# Patient Record
Sex: MALE | Race: WHITE | NOT HISPANIC OR LATINO | Employment: FULL TIME | URBAN - METROPOLITAN AREA
[De-identification: names, ages, dates, MRNs, and addresses within clinical notes are randomized per-mention and may not be internally consistent; named-entity substitution may affect disease eponyms.]

---

## 2019-05-03 ENCOUNTER — HOSPITAL ENCOUNTER (EMERGENCY)
Facility: CLINIC | Age: 34
Discharge: HOME | End: 2019-05-04
Attending: STUDENT IN AN ORGANIZED HEALTH CARE EDUCATION/TRAINING PROGRAM | Admitting: STUDENT IN AN ORGANIZED HEALTH CARE EDUCATION/TRAINING PROGRAM
Payer: COMMERCIAL
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
## 2019-05-03 DIAGNOSIS — T18.128A FOOD IMPACTION OF ESOPHAGUS, INITIAL ENCOUNTER: ICD-10-CM

## 2019-05-03 PROCEDURE — 99284 EMERGENCY DEPT VISIT MOD MDM: CPT | Mod: 25

## 2019-05-03 PROCEDURE — 25000128 H RX IP 250 OP 636: Performed by: STUDENT IN AN ORGANIZED HEALTH CARE EDUCATION/TRAINING PROGRAM

## 2019-05-03 PROCEDURE — 25500045 ZZH RX 255: Performed by: STUDENT IN AN ORGANIZED HEALTH CARE EDUCATION/TRAINING PROGRAM

## 2019-05-03 PROCEDURE — 99284 EMERGENCY DEPT VISIT MOD MDM: CPT | Mod: Z6 | Performed by: STUDENT IN AN ORGANIZED HEALTH CARE EDUCATION/TRAINING PROGRAM

## 2019-05-03 PROCEDURE — 96375 TX/PRO/DX INJ NEW DRUG ADDON: CPT

## 2019-05-03 PROCEDURE — 96374 THER/PROPH/DIAG INJ IV PUSH: CPT

## 2019-05-03 PROCEDURE — 96361 HYDRATE IV INFUSION ADD-ON: CPT

## 2019-05-03 RX ORDER — LORAZEPAM 2 MG/ML
0.5 INJECTION INTRAMUSCULAR ONCE
Status: COMPLETED | OUTPATIENT
Start: 2019-05-03 | End: 2019-05-03

## 2019-05-03 RX ORDER — LORAZEPAM 0.5 MG/1
0.5 TABLET ORAL ONCE
Status: DISCONTINUED | OUTPATIENT
Start: 2019-05-03 | End: 2019-05-03

## 2019-05-03 RX ADMIN — SODIUM CHLORIDE 500 ML: 9 INJECTION, SOLUTION INTRAVENOUS at 23:37

## 2019-05-03 RX ADMIN — LORAZEPAM 0.5 MG: 2 INJECTION INTRAMUSCULAR; INTRAVENOUS at 23:38

## 2019-05-03 RX ADMIN — GLUCAGON HYDROCHLORIDE 0.5 MG: 1 INJECTION, POWDER, FOR SOLUTION INTRAMUSCULAR; INTRAVENOUS; SUBCUTANEOUS at 23:39

## 2019-05-03 RX ADMIN — ANTACID/ANTIFLATULENT 4 G: 380; 550; 10; 10 GRANULE, EFFERVESCENT ORAL at 23:20

## 2019-05-04 ENCOUNTER — ANESTHESIA (OUTPATIENT)
Dept: SURGERY | Facility: CLINIC | Age: 34
End: 2019-05-04

## 2019-05-04 ENCOUNTER — ANESTHESIA EVENT (OUTPATIENT)
Dept: ANESTHESIOLOGY | Facility: CLINIC | Age: 34
End: 2019-05-04

## 2019-05-04 ENCOUNTER — ANESTHESIA EVENT (OUTPATIENT)
Dept: SURGERY | Facility: CLINIC | Age: 34
End: 2019-05-04

## 2019-05-04 ENCOUNTER — ANESTHESIA (OUTPATIENT)
Dept: ANESTHESIOLOGY | Facility: CLINIC | Age: 34
End: 2019-05-04
Payer: COMMERCIAL

## 2019-05-04 ENCOUNTER — HOSPITAL ENCOUNTER (INPATIENT)
Facility: CLINIC | Age: 34
Setting detail: SURGERY ADMIT
End: 2019-05-04
Attending: SURGERY | Admitting: SURGERY

## 2019-05-04 ENCOUNTER — HOSPITAL ENCOUNTER (OUTPATIENT)
Facility: CLINIC | Age: 34
Discharge: HOME | End: 2019-05-04
Attending: SURGERY | Admitting: SURGERY
Payer: COMMERCIAL

## 2019-05-04 VITALS
BODY MASS INDEX: 41.19 KG/M2 | SYSTOLIC BLOOD PRESSURE: 136 MMHG | RESPIRATION RATE: 16 BRPM | DIASTOLIC BLOOD PRESSURE: 89 MMHG | OXYGEN SATURATION: 97 % | WEIGHT: 292 LBS | HEART RATE: 107 BPM | TEMPERATURE: 97.9 F

## 2019-05-04 VITALS
RESPIRATION RATE: 16 BRPM | TEMPERATURE: 98.2 F | SYSTOLIC BLOOD PRESSURE: 141 MMHG | DIASTOLIC BLOOD PRESSURE: 67 MMHG | OXYGEN SATURATION: 98 %

## 2019-05-04 PROCEDURE — 36000052 ZZH SURGERY LEVEL 2 EA 15 ADDTL MIN: Performed by: SURGERY

## 2019-05-04 PROCEDURE — 25000128 H RX IP 250 OP 636: Performed by: NURSE ANESTHETIST, CERTIFIED REGISTERED

## 2019-05-04 PROCEDURE — 25000125 ZZHC RX 250: Performed by: NURSE ANESTHETIST, CERTIFIED REGISTERED

## 2019-05-04 PROCEDURE — 25800030 ZZH RX IP 258 OP 636: Performed by: NURSE ANESTHETIST, CERTIFIED REGISTERED

## 2019-05-04 PROCEDURE — 37000008 ZZH ANESTHESIA TECHNICAL FEE, 1ST 30 MIN: Performed by: SURGERY

## 2019-05-04 PROCEDURE — 71000012 ZZH RECOVERY PHASE 1 LEVEL 1 FIRST HR: Performed by: SURGERY

## 2019-05-04 PROCEDURE — 43247 EGD REMOVE FOREIGN BODY: CPT | Performed by: SURGERY

## 2019-05-04 PROCEDURE — 88305 TISSUE EXAM BY PATHOLOGIST: CPT | Performed by: SURGERY

## 2019-05-04 PROCEDURE — 40000671 ZZH STATISTIC ANESTHESIA CASE

## 2019-05-04 PROCEDURE — 25500045 ZZH RX 255: Performed by: STUDENT IN AN ORGANIZED HEALTH CARE EDUCATION/TRAINING PROGRAM

## 2019-05-04 PROCEDURE — 36000050 ZZH SURGERY LEVEL 2 1ST 30 MIN: Performed by: SURGERY

## 2019-05-04 PROCEDURE — 37000009 ZZH ANESTHESIA TECHNICAL FEE, EACH ADDTL 15 MIN: Performed by: SURGERY

## 2019-05-04 PROCEDURE — 71000027 ZZH RECOVERY PHASE 2 EACH 15 MINS: Performed by: SURGERY

## 2019-05-04 PROCEDURE — 43239 EGD BIOPSY SINGLE/MULTIPLE: CPT | Mod: 59 | Performed by: SURGERY

## 2019-05-04 RX ORDER — FENTANYL CITRATE 50 UG/ML
25-50 INJECTION, SOLUTION INTRAMUSCULAR; INTRAVENOUS
Status: DISCONTINUED | OUTPATIENT
Start: 2019-05-04 | End: 2019-05-04

## 2019-05-04 RX ORDER — PROPOFOL 10 MG/ML
INJECTION, EMULSION INTRAVENOUS PRN
Status: DISCONTINUED | OUTPATIENT
Start: 2019-05-04 | End: 2019-05-04

## 2019-05-04 RX ORDER — NALOXONE HYDROCHLORIDE 0.4 MG/ML
.1-.4 INJECTION, SOLUTION INTRAMUSCULAR; INTRAVENOUS; SUBCUTANEOUS
Status: DISCONTINUED | OUTPATIENT
Start: 2019-05-04 | End: 2019-05-05

## 2019-05-04 RX ORDER — SODIUM CHLORIDE, SODIUM LACTATE, POTASSIUM CHLORIDE, CALCIUM CHLORIDE 600; 310; 30; 20 MG/100ML; MG/100ML; MG/100ML; MG/100ML
INJECTION, SOLUTION INTRAVENOUS CONTINUOUS
Status: DISCONTINUED | OUTPATIENT
Start: 2019-05-04 | End: 2019-05-07

## 2019-05-04 RX ORDER — LIDOCAINE 40 MG/G
CREAM TOPICAL
Status: DISCONTINUED | OUTPATIENT
Start: 2019-05-04 | End: 2019-05-07

## 2019-05-04 RX ORDER — LIDOCAINE HYDROCHLORIDE 10 MG/ML
INJECTION, SOLUTION INFILTRATION; PERINEURAL PRN
Status: DISCONTINUED | OUTPATIENT
Start: 2019-05-04 | End: 2019-05-04

## 2019-05-04 RX ORDER — KETOROLAC TROMETHAMINE 30 MG/ML
30 INJECTION, SOLUTION INTRAMUSCULAR; INTRAVENOUS EVERY 6 HOURS PRN
Status: DISCONTINUED | OUTPATIENT
Start: 2019-05-04 | End: 2019-05-07

## 2019-05-04 RX ORDER — DEXAMETHASONE SODIUM PHOSPHATE 4 MG/ML
INJECTION, SOLUTION INTRA-ARTICULAR; INTRALESIONAL; INTRAMUSCULAR; INTRAVENOUS; SOFT TISSUE PRN
Status: DISCONTINUED | OUTPATIENT
Start: 2019-05-04 | End: 2019-05-04

## 2019-05-04 RX ORDER — LORAZEPAM 2 MG/ML
0.5 INJECTION INTRAMUSCULAR ONCE
Status: DISCONTINUED | OUTPATIENT
Start: 2019-05-04 | End: 2019-05-04 | Stop reason: HOSPADM

## 2019-05-04 RX ORDER — MEPERIDINE HYDROCHLORIDE 50 MG/ML
12.5 INJECTION INTRAMUSCULAR; INTRAVENOUS; SUBCUTANEOUS
Status: DISCONTINUED | OUTPATIENT
Start: 2019-05-04 | End: 2019-05-07

## 2019-05-04 RX ORDER — NALOXONE HYDROCHLORIDE 0.4 MG/ML
.1-.4 INJECTION, SOLUTION INTRAMUSCULAR; INTRAVENOUS; SUBCUTANEOUS
Status: DISCONTINUED | OUTPATIENT
Start: 2019-05-04 | End: 2019-05-04

## 2019-05-04 RX ORDER — FLUMAZENIL 0.1 MG/ML
0.2 INJECTION, SOLUTION INTRAVENOUS
Status: DISCONTINUED | OUTPATIENT
Start: 2019-05-04 | End: 2019-05-04

## 2019-05-04 RX ORDER — FENTANYL CITRATE 50 UG/ML
25-50 INJECTION, SOLUTION INTRAMUSCULAR; INTRAVENOUS
Status: DISCONTINUED | OUTPATIENT
Start: 2019-05-04 | End: 2019-05-07

## 2019-05-04 RX ORDER — ONDANSETRON 2 MG/ML
4 INJECTION INTRAMUSCULAR; INTRAVENOUS EVERY 6 HOURS PRN
Status: DISCONTINUED | OUTPATIENT
Start: 2019-05-04 | End: 2019-05-04

## 2019-05-04 RX ORDER — ONDANSETRON 4 MG/1
4 TABLET, ORALLY DISINTEGRATING ORAL EVERY 30 MIN PRN
Status: DISCONTINUED | OUTPATIENT
Start: 2019-05-04 | End: 2019-05-07

## 2019-05-04 RX ORDER — ONDANSETRON 4 MG/1
4 TABLET, ORALLY DISINTEGRATING ORAL EVERY 6 HOURS PRN
Status: DISCONTINUED | OUTPATIENT
Start: 2019-05-04 | End: 2019-05-04

## 2019-05-04 RX ORDER — ONDANSETRON 2 MG/ML
4 INJECTION INTRAMUSCULAR; INTRAVENOUS
Status: DISCONTINUED | OUTPATIENT
Start: 2019-05-04 | End: 2019-05-07

## 2019-05-04 RX ORDER — ONDANSETRON 2 MG/ML
INJECTION INTRAMUSCULAR; INTRAVENOUS PRN
Status: DISCONTINUED | OUTPATIENT
Start: 2019-05-04 | End: 2019-05-04

## 2019-05-04 RX ORDER — DEXAMETHASONE SODIUM PHOSPHATE 4 MG/ML
4 INJECTION, SOLUTION INTRA-ARTICULAR; INTRALESIONAL; INTRAMUSCULAR; INTRAVENOUS; SOFT TISSUE EVERY 10 MIN PRN
Status: DISCONTINUED | OUTPATIENT
Start: 2019-05-04 | End: 2019-05-07

## 2019-05-04 RX ORDER — ONDANSETRON 4 MG/1
4 TABLET, ORALLY DISINTEGRATING ORAL EVERY 30 MIN PRN
Status: DISCONTINUED | OUTPATIENT
Start: 2019-05-04 | End: 2019-05-04

## 2019-05-04 RX ORDER — LABETALOL HYDROCHLORIDE 5 MG/ML
10 INJECTION, SOLUTION INTRAVENOUS
Status: DISCONTINUED | OUTPATIENT
Start: 2019-05-04 | End: 2019-05-07

## 2019-05-04 RX ORDER — ALBUTEROL SULFATE 0.83 MG/ML
2.5 SOLUTION RESPIRATORY (INHALATION) EVERY 4 HOURS PRN
Status: DISCONTINUED | OUTPATIENT
Start: 2019-05-04 | End: 2019-05-07

## 2019-05-04 RX ORDER — ONDANSETRON 2 MG/ML
4 INJECTION INTRAMUSCULAR; INTRAVENOUS EVERY 30 MIN PRN
Status: DISCONTINUED | OUTPATIENT
Start: 2019-05-04 | End: 2019-05-07

## 2019-05-04 RX ORDER — GLYCOPYRROLATE 0.2 MG/ML
INJECTION, SOLUTION INTRAMUSCULAR; INTRAVENOUS PRN
Status: DISCONTINUED | OUTPATIENT
Start: 2019-05-04 | End: 2019-05-04

## 2019-05-04 RX ORDER — SODIUM CHLORIDE, SODIUM LACTATE, POTASSIUM CHLORIDE, CALCIUM CHLORIDE 600; 310; 30; 20 MG/100ML; MG/100ML; MG/100ML; MG/100ML
INJECTION, SOLUTION INTRAVENOUS CONTINUOUS PRN
Status: DISCONTINUED | OUTPATIENT
Start: 2019-05-04 | End: 2019-05-04

## 2019-05-04 RX ORDER — FENTANYL CITRATE 50 UG/ML
INJECTION, SOLUTION INTRAMUSCULAR; INTRAVENOUS PRN
Status: DISCONTINUED | OUTPATIENT
Start: 2019-05-04 | End: 2019-05-04

## 2019-05-04 RX ORDER — ONDANSETRON 2 MG/ML
4 INJECTION INTRAMUSCULAR; INTRAVENOUS EVERY 30 MIN PRN
Status: DISCONTINUED | OUTPATIENT
Start: 2019-05-04 | End: 2019-05-04

## 2019-05-04 RX ORDER — KETOROLAC TROMETHAMINE 30 MG/ML
30 INJECTION, SOLUTION INTRAMUSCULAR; INTRAVENOUS EVERY 6 HOURS PRN
Status: DISCONTINUED | OUTPATIENT
Start: 2019-05-04 | End: 2019-05-04

## 2019-05-04 RX ORDER — ALBUTEROL SULFATE 0.83 MG/ML
2.5 SOLUTION RESPIRATORY (INHALATION) EVERY 4 HOURS PRN
Status: DISCONTINUED | OUTPATIENT
Start: 2019-05-04 | End: 2019-05-04

## 2019-05-04 RX ORDER — SODIUM CHLORIDE, SODIUM LACTATE, POTASSIUM CHLORIDE, CALCIUM CHLORIDE 600; 310; 30; 20 MG/100ML; MG/100ML; MG/100ML; MG/100ML
INJECTION, SOLUTION INTRAVENOUS CONTINUOUS
Status: DISCONTINUED | OUTPATIENT
Start: 2019-05-04 | End: 2019-05-04

## 2019-05-04 RX ADMIN — DEXAMETHASONE SODIUM PHOSPHATE 4 MG: 4 INJECTION, SOLUTION INTRA-ARTICULAR; INTRALESIONAL; INTRAMUSCULAR; INTRAVENOUS; SOFT TISSUE at 06:57

## 2019-05-04 RX ADMIN — ROCURONIUM BROMIDE 30 MG: 10 INJECTION INTRAVENOUS at 06:36

## 2019-05-04 RX ADMIN — LIDOCAINE HYDROCHLORIDE 50 MG: 10 INJECTION, SOLUTION INFILTRATION; PERINEURAL at 06:32

## 2019-05-04 RX ADMIN — SODIUM CHLORIDE, SODIUM LACTATE, POTASSIUM CHLORIDE, CALCIUM CHLORIDE: 600; 310; 30; 20 INJECTION, SOLUTION INTRAVENOUS at 06:28

## 2019-05-04 RX ADMIN — ONDANSETRON 4 MG: 2 INJECTION INTRAMUSCULAR; INTRAVENOUS at 06:56

## 2019-05-04 RX ADMIN — SODIUM CHLORIDE, POTASSIUM CHLORIDE, SODIUM LACTATE AND CALCIUM CHLORIDE: 600; 310; 30; 20 INJECTION, SOLUTION INTRAVENOUS at 06:28

## 2019-05-04 RX ADMIN — GLYCOPYRROLATE 0.6 MG: 0.2 INJECTION, SOLUTION INTRAMUSCULAR; INTRAVENOUS at 06:28

## 2019-05-04 RX ADMIN — MIDAZOLAM 2 MG: 1 INJECTION INTRAMUSCULAR; INTRAVENOUS at 06:28

## 2019-05-04 RX ADMIN — PROPOFOL 200 MG: 10 INJECTION, EMULSION INTRAVENOUS at 06:32

## 2019-05-04 RX ADMIN — Medication 100 MG: at 06:32

## 2019-05-04 RX ADMIN — SUGAMMADEX 200 MG: 100 INJECTION, SOLUTION INTRAVENOUS at 07:15

## 2019-05-04 RX ADMIN — ANTACID/ANTIFLATULENT 4 G: 380; 550; 10; 10 GRANULE, EFFERVESCENT ORAL at 00:33

## 2019-05-04 RX ADMIN — FENTANYL CITRATE 100 MCG: 50 INJECTION, SOLUTION INTRAMUSCULAR; INTRAVENOUS at 06:28

## 2019-05-04 NOTE — OR NURSING
Sips on water. Ready for discharge. crna calls for status report. Wife here. Instructions reviewed. No further questions.

## 2019-05-04 NOTE — OP NOTE
[facility]     Operative Note     Pre-operative diagnosis:         swallowing difficulty  Post-operative diagnosis        Impacted food bolus  Procedure:      Procedure(s):  ESOPHAGOGASTRODUODENOSCOPY (EGD) with removal of foreign body  Surgeon:         Surgeon(s) and Role:     * Gee Garay DO - Primary  Anesthesia:     General             Estimated blood loss:  Minimal  Drains: None  Specimens:       ID Type Source Tests Collected by Time Destination   A :  Biopsy Gastro Esophageal Junction SURGICAL PATHOLOGY EXAM Gee Garay DO 5/4/2019  7:12 AM        Findings:                     impacted food bolus at the GE junction with mild, grade 1 esophagitis.  Otherwise, normal anatomy .  Complications:            None.  Implants:  * No implants in log *                 Indications for procedure: Patient is a 57-year-old male who presented to the ED with complaints of dysphasia and inability to swallow his secretions.  Impacted food bolus was suspected.  EGD was advised.  Risk and benefits of the procedure were discussed in detail to include risk of bleeding, risk of infection, risk of perforation, need for further surgery.  Consent was obtained.    Procedure: Patient was brought to the operating room and placed in the supine position.  Endotracheal tube was placed per CRNA.  Anesthesia was controlled by the CRNA.  Timeout was performed ensuring right patient right procedure.  Endoscope was inserted into the mouth and passed into the oropharynx and into the esophagus under direct visualization.  Upon entering the esophagus near the GE junction and impacted food bolus was identified, consistent with the patient's history of recent bratwurst and ingestion.    At this points large forcep was used to piecemeal the impacted food into smaller pieces.  Eventually a small amount of piece of food was made the past 3 GE junction into the stomach without difficulty.   The scope was then passed past the antrum into the duodenum up to the level of the second portion of duodenum.  Upon pulling back the second portion of duodenum was normal with no abnormalities along the duodenal bulb no evidence of ulceration or hemorrhage.  Antrum showed normal anatomy without evidence of congestion or erythema.  The scope was then retroflexed showing no evidence of hiatal hernia.  One small fundic polyp was noted without evidence of recent hemorrhage or bleeding.  The gastric body up was within normal limits.  The scope was withdrawn to GE junction noting small amount of esophagitis, grade 1, biopsies taken with biopsy forceps.  The scope passed through the GE junction easily without difficulty and no obvious stricturing or webbing was noted.  Upon withdrawal from the esophagus there was no Schatzki rings or webbing noted Z line was noted to be at 37 cm.  The scope was removed.  Patient tolerated the procedure well was extubated and transferred to PACU for recovery.  Patient will be discharged home.

## 2019-05-04 NOTE — ANESTHESIA PREPROCEDURE EVALUATION
Anesthesia Pre-Procedure Evaluation    Patient: Thomas Degonda   MRN: [id number] : 11/10/1961          Preoperative Diagnosis: swallowing difficulty    Procedure(s):  ESOPHAGOGASTRODUODENOSCOPY (EGD)    No past medical history on file.  No past surgical history on file.    Anesthesia Evaluation     .             ROS/MED HX    ENT/Pulmonary:     (+)VLADISLAV risk factors , . .    Neurologic:       Cardiovascular:         METS/Exercise Tolerance:     Hematologic:         Musculoskeletal:         GI/Hepatic:     (+) GERD       Renal/Genitourinary:         Endo:     (+) Obesity, .      Psychiatric:         Infectious Disease:         Malignancy:         Other:                          Physical Exam  Normal systems: cardiovascular, pulmonary and dental    Airway   Mallampati: II  TM distance: >3 FB  Neck ROM: full    Dental     Cardiovascular       Pulmonary             No results found for: WBC, HGB, HCT, PLT, CRP, SED, NA, POTASSIUM, CHLORIDE, CO2, BUN, CR, GLC, JESSICA, PHOS, MAG, ALBUMIN, PROTTOTAL, ALT, AST, GGT, ALKPHOS, BILITOTAL, BILIDIRECT, LIPASE, AMYLASE, MAINOR, PTT, INR, FIBR, TSH, T4, T3, HCG, HCGS, CKTOTAL, CKMB, TROPN    Preop Vitals  BP Readings from Last 3 Encounters:   No data found for BP    Pulse Readings from Last 3 Encounters:   No data found for Pulse      Resp Readings from Last 3 Encounters:   No data found for Resp    SpO2 Readings from Last 3 Encounters:   No data found for SpO2      Temp Readings from Last 1 Encounters:   No data found for Temp    Ht Readings from Last 1 Encounters:   No data found for Ht      Wt Readings from Last 1 Encounters:   No data found for Wt    There is no height or weight on file to calculate BMI.       Anesthesia Plan      History & Physical Review  History and physical reviewed and following examination; no interval change.    ASA Status:  3 emergent.    NPO Status:  > 6 hours    Plan for General and ETT with Intravenous induction. Maintenance will be Balanced.       Additional equipment: Videolaryngoscope      Postoperative Care      Consents  Anesthetic plan, risks, benefits and alternatives discussed with:  Patient..                 Wagner Lawson CRNA, APRN CRNA

## 2019-05-04 NOTE — ANESTHESIA CARE TRANSFER NOTE
Patient: Thomas Degonda    Procedure(s):  ESOPHAGOGASTRODUODENOSCOPY (EGD)    Diagnosis: swallowing difficulty  Diagnosis Additional Information: No value filed.    Anesthesia Type:   General, ETT     Note:  Airway :Nasal Cannula  Patient transferred to:PACU  Handoff Report: Identifed the Patient, Identified the Reponsible Provider, Reviewed the pertinent medical history, Discussed the surgical course, Reviewed Intra-OP anesthesia mangement and issues during anesthesia, Set expectations for post-procedure period and Allowed opportunity for questions and acknowledgement of understanding      Vitals: (Last set prior to Anesthesia Care Transfer)    CRNA VITALS  5/4/2019 0654 - 5/4/2019 0728      5/4/2019             Pulse:  149    SpO2:  84 %  (Abnormal)     Resp Rate (observed):  4  (Abnormal)                 Electronically Signed By: Andrei Kamara CRNA, APRN CRNA  May 4, 2019  7:28 AM

## 2019-05-04 NOTE — ED PROVIDER NOTES
"  History     Chief Complaint   Patient presents with     Dysphagia     \" took 1 bite of brat, and it has been stuck there ever since. Last 5 hrs\" Spitting saliva. Tried to drink water, but comes back up.      HPI  Thomas J Degonda Jr. is a 34 year old male who presents for evaluation of foreign body sensation within esophagus after taking the first bite of his bratwurst for dinner.  Patient claims that around 6:30 PM he had taken a bite of his bratwurst and does not believe that he chewed very well, he felt a sensation as though the chunk of meat got lodged deep within his esophagus and he has since been unable to drink water or tolerate his own secretions.  He regurgitates his secretions and is unable to swallow since time of onset.  Has never had similar sensation in the past.  Occasionally suffers from \"acid reflux\".  He denies fever, chills, cough, shortness of breath, abdominal pain, or other concerning features.  Patient attempted to drink Coca-Cola prior to arrival without relief.    Allergies:  No Known Allergies    Problem List:    Patient Active Problem List    Diagnosis Date Noted     CARDIOVASCULAR SCREENING; LDL GOAL LESS THAN 160 03/07/2012     Priority: Medium     Obesity      Priority: Medium        Past Medical History:    Past Medical History:   Diagnosis Date     Obesity        Past Surgical History:    Past Surgical History:   Procedure Laterality Date     No prior surgery         Family History:    Family History   Problem Relation Age of Onset     Family History Negative Mother      Hyperlipidemia Mother      Family History Negative Father      Family History Negative Brother        Social History:  Marital Status:  Single [1]  Social History     Tobacco Use     Smoking status: Never Smoker     Smokeless tobacco: Never Used   Substance Use Topics     Alcohol use: Yes     Comment: occasional     Drug use: No        Medications:      ORDER FOR DME         Review of Systems  Constitutional:  " Negative for fever or recent illness.  Cardiovascular:  Negative for chest pain.  Respiratory:  Negative for cough or shortness of breath.  Gastrointestinal: Positive for esophageal foreign body sensation.  Negative for abdominal pain, vomiting or diarrhea.     All others reviewed and are negative.      Physical Exam   BP: (!) 160/104  Pulse: 87  Temp: 97.9  F (36.6  C)  Resp: 16  Weight: 132.5 kg (292 lb)  SpO2: 99 %      Physical Exam  Constitutional:  Well developed, well nourished.  Appears nontoxic uncomfortable sitting on the edge of the gurney.  HENT:  Normocephalic and atraumatic.  Symmetric in appearance.  Eyes:  Conjunctivae are normal.  Neck:  Neck supple.  Cardiovascular:  No cyanosis.  RRR.  No audible murmurs noted.    Respiratory:  Effort normal without sign of respiratory distress.  CTAB without diminished regions.    Gastrointestinal:  Soft nondistended abdomen.  Nontender and without guarding.  No rigidity or rebound tenderness.  Negative So's sign.  Negative McBurney's point.    Musculoskeletal:  Moves extremities spontaneously.  Neurological:  Patient is alert.  Skin:  Skin is warm and dry.  Psychiatric:  Normal mood and affect.      ED Course        Procedures              Critical Care time:  none               No results found for this or any previous visit (from the past 24 hour(s)).    Medications   glucagon injection 0.5 mg (has no administration in time range)   LORazepam (ATIVAN) injection 0.5 mg (has no administration in time range)   sod bicarbonate-citric acid-simethicone (EZ GAS) 2.21-1.53-0.04 g packet 4 g (4 g Oral Given 5/4/19 0033)   glucagon injection 0.5 mg (0.5 mg Intravenous Given 5/3/19 2339)   0.9% sodium chloride BOLUS (0 mLs Intravenous Stopped 5/4/19 0032)   LORazepam (ATIVAN) injection 0.5 mg (0.5 mg Intravenous Given 5/3/19 2338)       Assessments & Plan (with Medical Decision Making)   Thomas J Degonda Jr. is a 34 year old male who presented to the department with  esophageal food impaction sensation and inability to tolerate his own secretions.  He denies history of similar symptoms in the past, no previous endoscopies.  Tried multiple medical therapies including easy gas, glucagon, and lorazepam without resolution of symptoms.  Eventually consulted on-call general surgeon Dr. Petersen who will take the patient to the OR for endoscopy.  No further recommendations.  Patient understands diagnosis and recommended therapy.          Disclaimer:  This note consists of symbols derived from keyboarding, dictation, and/or voice recognition software.  As a result, there may be errors in the script that have gone undetected.  Please consider this when interpreting information found in the chart.        I have reviewed the nursing notes.    I have reviewed the findings, diagnosis, plan and need for follow up with the patient.          Medication List      There are no discharge medications for this visit.         Final diagnoses:   Food impaction of esophagus, initial encounter       5/3/2019   [facility] EMERGENCY DEPARTMENT     Ant Jean DO  05/04/19 0459

## 2019-05-04 NOTE — ANESTHESIA POSTPROCEDURE EVALUATION
Patient: Thomas Degonda    Procedure(s):  ESOPHAGOGASTRODUODENOSCOPY (EGD)    Diagnosis:swallowing difficulty  Diagnosis Additional Information: No value filed.    Anesthesia Type:  General, ETT    Note:  Anesthesia Post Evaluation    Patient location during evaluation: Phase 2 and Bedside  Patient participation: Able to fully participate in evaluation  Level of consciousness: awake  Pain management: adequate  Airway patency: patent  Cardiovascular status: acceptable  Respiratory status: acceptable  Hydration status: acceptable  PONV: none     Anesthetic complications: None          Last vitals:  Vitals:    05/04/19 0725 05/04/19 0745   BP: 131/70 139/73   Resp: 16 16   Temp: 36.8  C (98.2  F)    SpO2: 95% 98%         Electronically Signed By: Andrei Kamara CRNA, APRN CRNA  May 4, 2019  7:54 AM

## 2019-05-04 NOTE — ANESTHESIA PREPROCEDURE EVALUATION
"Anesthesia Pre-Procedure Evaluation    Patient: Thomas J Degonda Jr.   MRN: [id number] : 1985          Preoperative Diagnosis: difficulty swallowing    Procedure(s):  ESOPHAGOGASTRODUODENOSCOPY (EGD)    Past Medical History:   Diagnosis Date     Obesity      Past Surgical History:   Procedure Laterality Date     No prior surgery         Anesthesia Evaluation     .             ROS/MED HX    ENT/Pulmonary:     (+)VLADISLAV risk factors , . .    Neurologic:       Cardiovascular:         METS/Exercise Tolerance:     Hematologic:         Musculoskeletal:         GI/Hepatic:     (+) GERD       Renal/Genitourinary:         Endo:     (+) Obesity, .      Psychiatric:         Infectious Disease:         Malignancy:         Other:                          Physical Exam  Normal systems: cardiovascular, pulmonary and dental    Airway   Mallampati: II  TM distance: >3 FB  Neck ROM: full    Dental     Cardiovascular       Pulmonary             Lab Results   Component Value Date    WBC 6.8 2012    HGB 15.0 2012    HCT 43.8 2012     2012     2012    POTASSIUM 4.4 2012    CHLORIDE 105 2012    CO2 25 2012    BUN 18 2012    CR 0.72 2012    GLC 88 2014    JESSICA 9.3 2012       Preop Vitals  BP Readings from Last 3 Encounters:   19 136/89   14 122/72   12 110/82    Pulse Readings from Last 3 Encounters:   19 107   14 84   12 80      Resp Readings from Last 3 Encounters:   19 16   12 18    SpO2 Readings from Last 3 Encounters:   19 97%      Temp Readings from Last 1 Encounters:   19 36.6  C (97.9  F) (Oral)    Ht Readings from Last 1 Encounters:   14 1.793 m (5' 10.6\")      Wt Readings from Last 1 Encounters:   19 132.5 kg (292 lb)    Estimated body mass index is 41.19 kg/m  as calculated from the following:    Height as of 14: 1.793 m (5' 10.6\").    Weight as of 5/3/19: 132.5 kg " (292 lb).       Anesthesia Plan      History & Physical Review  History and physical reviewed and following examination; no interval change.    ASA Status:  3 emergent.    NPO Status:  > 6 hours    Plan for General and ETT with Intravenous induction. Maintenance will be Balanced.      Additional equipment: Videolaryngoscope      Postoperative Care      Consents  Anesthetic plan, risks, benefits and alternatives discussed with:  Patient..                 Wagner Lawson, CRNA, APRN CRNA

## 2019-05-04 NOTE — OR NURSING
To icu with hanna gallegos for pacu. Pt awakens and responds approp. Swallowing without difficulty. Dozes. Snoring reg resp. Vss. Iv patent will cont to reassess.

## 2019-05-04 NOTE — DISCHARGE INSTRUCTIONS
Same Day Surgery Discharge Instructions  Special Precautions After Surgery - Adult    1. It is not unusual to feel lightheaded or faint, up to 24 hours after surgery or while taking pain medication.  If you have these symptoms; sit for a few minutes before standing and have someone assist you when getting up.  2. You should rest and relax for the next 24 hours and must have someone stay with you for at least 24 hours after your discharge.  3. DO NOT DRIVE any vehicle or operate mechanical equipment for 24 hours following the end of your surgery.  DO NOT DRIVE while taking narcotic pain medications that have been prescribed by your physician.  If you had a limb operated on, you must be able to use it fully to drive.  4. DO NOT drink alcoholic beverages for 24 hours following surgery or while taking prescription pain medication.  5. Drink clear liquids (apple juice, ginger ale, broth, 7-Up, etc.).  Progress to your regular diet as you feel able.  6. Any questions call your physician and do not make important decisions for 24 hours.    ACTIVITY  ? Rest today.  No activity or diet restrictions.  ? Resume activity as tolerated.  ? See printed discharge sheet.     INCISIONAL CARE  ? May bathe / shower after 24 hours.  ? Be alert for signs of infection:  redness, swelling, heat, drainage of pus, and/or elevated temperature.  Contact your doctor if these occur.        Call for an appointment to return to the clinic (no follow-up needed).    Medications:  ? Regular meds no new prescriptions  ? Follow the instructions on the bottle.     Additional discharge instructions: call with any questions and or concerns  __________________________________________________________________________________________________________________________________  IMPORTANT NUMBERS:    [facility] [place] Number:  [phone], [phone]  Pharmacy:  [phone]  Same Day Surgery:  [phone], Monday - Friday until 8:30 p.m.  Urgent  Care:  [phone]  Emergency Room:  [phone]      [facility]:  [phone]                                                                             [facility]:  [phone] option 1  [facility]:  [phone]     [facility]:  [phone]   [facility]:  [phone]   Home Medical Equipment: [phone]  [facility]:  [phone]    Follow up with PCP within 2 weeks

## 2019-05-04 NOTE — CONSULTS
[facility]    History and Physical  Surgery     Date of Admission:  (Not on file)    Assessment & Plan   Thomas Degonda is a 57 year old male who presents with impacted food bolus    Principal Problem:    Esophageal foreign body, initial encounter    Assessment: hemodynamically stable    Plan: Pt to OR for EGD to remove an impacted food bolus from the esophagus.     Risks and benefits discussed in detail. Consent signed  Active Problems:    Class 2 obesity due to excess calories without serious comorbidity in adult    Assessment: stable    Plan: weight reduction discussed      Gee Garay D.O.     Code Status   Full Code    Primary Care Physician   *No primary care provider on file.    Chief Complaint   Impacted food bolus    History is obtained from the patient    History of Present Illness   Thomas Degonda is a 57 year old male who presents with a 12-hour duration of impacted food in the esophagus after eating a bratwurst meal.  Patient stated that after his first bite he felt as if something was stuck in his throat from there he attempted to drink some fluid and immediately began to vomit.  He waited about 6 hours and attempted to drink fluid again and immediately vomited food contents.  Since that time is been unable to swallow any secretions.  He denies any severe pain but does report some discomfort in his chest.  He denies any nausea but does report dysphasia with inability to swallow and keep his secretions cleared.  He is hemodynamically stable.  Nothing seems to make it better or worse.  He has never had this before.  No family history of esophageal cancer.  He is a non-smoker.  No previous surgeries.    Past Medical History    I have reviewed this patient's medical history and updated it with pertinent information if needed.   No past medical history on file.    Past Surgical History   I have reviewed this patient's surgical history and updated it with pertinent  information if needed.  No past surgical history on file.    Prior to Admission Medications   Cannot display prior to admission medications because the patient has not been admitted in this contact.     Allergies   No Known Allergies    Social History   I have reviewed this patient's social history and updated it with pertinent information if needed. Thomas Degonda      Family History   I have reviewed this patient's family history and updated it with pertinent information if needed.   No family history on file.    Review of Systems   The 10 point Review of Systems is negative other than noted in the HPI.     Physical Exam                      Vital Signs with Ranges  BP: ()/()   Arterial Line BP: ()/()   0 lbs 0 oz  There is no height or weight on file to calculate BMI.    Constitutional: awake, alert, cooperative, no apparent distress, and appears stated age  Eyes: Lids and lashes normal, pupils equal, round and reactive to light, extra ocular muscles intact, sclera clear, conjunctiva normal  ENT: Normocephalic, without obvious abnormality, atraumatic, sinuses nontender on palpation, reased work of breathing, good air exchange,   Cardiovascular: Normal apical impulse, regular rate and rhythm,   GI: No scars, normal bowel sounds, soft, non-distended, non-tender, no masses palpated, no hepatosplenomegally  Genitounirinary: deferred  Skin: no bruising or bleeding, normal skin color, texture, turgor, no redness, warmth, or swelling, no rashes and no lesions  Musculoskeletal: There is no redness, warmth, or swelling of the joints.  Full range of motion noted.  Motor strength is 5 out of 5 all extremities bilaterally.  Tone is normal.  Neurologic: Awake, alert, oriented to name, place and time.  Cranial nerves II-XII are grossly intact.  Motor is 5 out of 5 bilaterally.  .  Neuropsychiatric: General: normal, calm and normal eye contact    Data   No results found for this or any previous visit (from the past 24  hour(s)).

## 2019-05-04 NOTE — LETTER
[facility]           [address] [place] [address]           [place] [address]  Ant ANGEL Degonda Jr.  [address] [place] [id number]  May 9, 2019    Dear Ant,   This letter is to inform you of the results of your pathology report on your upper endoscopy (EGD).   If you do have further questions please don t hesitate to call my assistant at [phone].  We do not have someone answering the phone all the time at my assistants number so if leave a message it may take a day or so to get back to you.  So if more urgent then call the below number.    To make an appointment call [phone]:  Your pathology report was:  Gastroesophageal junction biopsy:   - Gastroesophageal junction with mucosal eosinophilia.  Showed findings consistent with reflux (heartburn)    If you are doing well with your treatment for mild reflux disease (heartburn), then follow up as directed on your post-endoscopy letter.    If you are interested in other options for your treatment of reflux disease (heartburn), then please see the options below.  1. We will first need to study your esophagus and make sure that it is working properly. The study is called a mamometry study. It involves placing a small catheter into your esophagus and measuring the pressures and coordination of your esophagus when swallowing.  2. If you wish to have this done, please call my assistant  At [phone].  to set up the study.  3. After the study is completed you will need to set up a follow up appointment with me to discuss the results and treatment options. To schedule an appointment, please call our Specialty Scheduling Line at [phone].  4.  Or you may just call our Specialty Scheduling Line at [phone] to set up an appointment with me to discuss you options.    Sincerely,   Gee Garay D.O.

## 2019-05-04 NOTE — BRIEF OP NOTE
[facility]    Brief Operative Note    Pre-operative diagnosis: swallowing difficulty  Post-operative diagnosis Impacted food bolus  Procedure: Procedure(s):  ESOPHAGOGASTRODUODENOSCOPY (EGD) with removal of foreign body  Surgeon: Surgeon(s) and Role:     * Gee Garay DO - Primary  Anesthesia: General   Estimated blood loss: Minimal  Drains: None  Specimens:   ID Type Source Tests Collected by Time Destination   A :  Biopsy Gastro Esophageal Junction SURGICAL PATHOLOGY EXAM Gee Garay DO 5/4/2019  7:12 AM      Findings:   impacted food bolus at the GE junction with mild, grade 1 esophagitis.  Otherwise, normal anatomy .  Complications: None.  Implants:  * No implants in log *

## 2019-05-04 NOTE — OR NURSING
Pt awake and calls wife to go to icu. Sips on water. arpan well. Will reassess and discharge home.

## 2019-05-04 NOTE — ANESTHESIA CARE TRANSFER NOTE
Patient: Thomas J Degonda Jr.    Procedure(s):  ESOPHAGOGASTRODUODENOSCOPY (EGD)    Diagnosis: difficulty swallowing  Diagnosis Additional Information: No value filed.    Anesthesia Type:   General, ETT     Note:  Airway :Face Mask  Patient transferred to:PACU  Handoff Report: Identifed the Patient, Identified the Reponsible Provider, Reviewed the pertinent medical history, Discussed the surgical course, Reviewed Intra-OP anesthesia mangement and issues during anesthesia, Set expectations for post-procedure period and Allowed opportunity for questions and acknowledgement of understanding      Vitals: (Last set prior to Anesthesia Care Transfer)    CRNA VITALS  5/4/2019 0654 - 5/4/2019 0754      5/4/2019             SpO2:  100 %    EKG:  Sinus tachycardia                Electronically Signed By: Wagner Lawson CRNA, APRN CRNA  May 4, 2019  1:42 PM

## 2019-05-05 ENCOUNTER — ANESTHESIA EVENT (OUTPATIENT)
Dept: SURGERY | Facility: CLINIC | Age: 34
End: 2019-05-05

## 2019-05-05 PROCEDURE — 99204 OFFICE O/P NEW MOD 45 MIN: CPT | Mod: 57 | Performed by: SURGERY

## 2019-05-05 RX ORDER — ONDANSETRON 2 MG/ML
4 INJECTION INTRAMUSCULAR; INTRAVENOUS
Status: CANCELLED | OUTPATIENT
Start: 2019-05-05

## 2019-05-05 RX ORDER — ONDANSETRON 4 MG/1
4 TABLET, ORALLY DISINTEGRATING ORAL EVERY 6 HOURS PRN
Status: CANCELLED | OUTPATIENT
Start: 2019-05-05

## 2019-05-05 RX ORDER — FLUMAZENIL 0.1 MG/ML
0.2 INJECTION, SOLUTION INTRAVENOUS
Status: CANCELLED | OUTPATIENT
Start: 2019-05-05 | End: 2019-05-06

## 2019-05-05 RX ORDER — LIDOCAINE 40 MG/G
CREAM TOPICAL
Status: CANCELLED | OUTPATIENT
Start: 2019-05-05

## 2019-05-05 RX ORDER — NALOXONE HYDROCHLORIDE 0.4 MG/ML
.1-.4 INJECTION, SOLUTION INTRAMUSCULAR; INTRAVENOUS; SUBCUTANEOUS
Status: CANCELLED | OUTPATIENT
Start: 2019-05-05 | End: 2019-05-06

## 2019-05-05 RX ORDER — ONDANSETRON 2 MG/ML
4 INJECTION INTRAMUSCULAR; INTRAVENOUS EVERY 6 HOURS PRN
Status: CANCELLED | OUTPATIENT
Start: 2019-05-05

## 2019-05-05 NOTE — OP NOTE
[facility]     Operative Note     Pre-operative diagnosis:         swallowing difficulty  Post-operative diagnosis        Impacted food bolus  Procedure:      Procedure(s):  ESOPHAGOGASTRODUODENOSCOPY (EGD) with removal of foreign body  Surgeon:         Surgeon(s) and Role:     * Gee Garay DO - Primary  Anesthesia:     General             Estimated blood loss:  Minimal  Drains: None  Specimens:       ID Type Source Tests Collected by Time Destination   A :  Biopsy Gastro Esophageal Junction SURGICAL PATHOLOGY EXAM Gee Garay DO 5/4/2019  7:12 AM        Findings:                     impacted food bolus at the GE junction with mild, grade 1 esophagitis.  Otherwise, normal anatomy .  Complications:            None.  Implants:  * No implants in log *                    Indications for procedure: Patient is a 57-year-old male who presented to the ED with complaints of dysphasia and inability to swallow his secretions.  Impacted food bolus was suspected.  EGD was advised.  Risk and benefits of the procedure were discussed in detail to include risk of bleeding, risk of infection, risk of perforation, need for further surgery.  Consent was obtained.     Procedure: Patient was brought to the operating room and placed in the supine position.  Endotracheal tube was placed per CRNA.  Anesthesia was controlled by the CRNA.  Timeout was performed ensuring right patient right procedure.  Endoscope was inserted into the mouth and passed into the oropharynx and into the esophagus under direct visualization.  Upon entering the esophagus near the GE junction and impacted food bolus was identified, consistent with the patient's history of recent bratwurst and ingestion.     At this points large forcep was used to piecemeal the impacted food into smaller pieces.  Eventually a small amount of piece of food was made the past 3 GE junction into the stomach without  difficulty.  The scope was then passed past the antrum into the duodenum up to the level of the second portion of duodenum.  Upon pulling back the second portion of duodenum was normal with no abnormalities along the duodenal bulb no evidence of ulceration or hemorrhage.  Antrum showed normal anatomy without evidence of congestion or erythema.  The scope was then retroflexed showing no evidence of hiatal hernia.  One small fundic polyp was noted without evidence of recent hemorrhage or bleeding.  The gastric body up was within normal limits.  The scope was withdrawn to GE junction noting small amount of esophagitis, grade 1, biopsies taken with biopsy forceps.  The scope passed through the GE junction easily without difficulty and no obvious stricturing or webbing was noted.  Upon withdrawal from the esophagus there was no Schatzki rings or webbing noted Z line was noted to be at 37 cm.  The scope was removed.  Patient tolerated the procedure well was extubated and transferred to PACU for recovery.  Patient will be discharged home.

## 2019-05-05 NOTE — BRIEF OP NOTE
[facility]     Brief Operative Note     Pre-operative diagnosis:         swallowing difficulty  Post-operative diagnosis        Impacted food bolus  Procedure:      Procedure(s):  ESOPHAGOGASTRODUODENOSCOPY (EGD) with removal of foreign body  Surgeon:         Surgeon(s) and Role:     * Gee Garay DO - Primary  Anesthesia:     General             Estimated blood loss:  Minimal  Drains: None  Specimens:       ID Type Source Tests Collected by Time Destination   A :  Biopsy Gastro Esophageal Junction SURGICAL PATHOLOGY EXAM Gee Garay DO 5/4/2019  7:12 AM        Findings:                     impacted food bolus at the GE junction with mild, grade 1 esophagitis.  Otherwise, normal anatomy .  Complications:            None.  Implants:  * No implants in log *

## 2019-05-05 NOTE — H&P
[facility]     History and Physical  Surgery     Date of Admission:         Assessment & Plan     Thomas Degonda is a 57 year old male who presents with impacted food bolus     Principal Problem:    Esophageal foreign body, initial encounter    Assessment: hemodynamically stable    Plan: Pt to OR for EGD to remove an impacted food bolus from the esophagus.      Risks and benefits discussed in detail. Consent signed  Active Problems:    Class 2 obesity due to excess calories without serious comorbidity in adult    Assessment: stable    Plan: weight reduction discussed        Gee Garay D.O.           Code Status      Full Code          Primary Care Physician      *No primary care provider on file.          Chief Complaint      Impacted food bolus     History is obtained from the patient          History of Present Illness     Thomas Degonda is a 57 year old male who presents with a 12-hour duration of impacted food in the esophagus after eating a bratwurst meal.  Patient stated that after his first bite he felt as if something was stuck in his throat from there he attempted to drink some fluid and immediately began to vomit.  He waited about 6 hours and attempted to drink fluid again and immediately vomited food contents.  Since that time is been unable to swallow any secretions.  He denies any severe pain but does report some discomfort in his chest.  He denies any nausea but does report dysphasia with inability to swallow and keep his secretions cleared.  He is hemodynamically stable.  Nothing seems to make it better or worse.  He has never had this before.  No family history of esophageal cancer.  He is a non-smoker.  No previous surgeries.          Past Medical History       I have reviewed this patient's medical history and updated it with pertinent information if needed.   No past medical history on file.          Past Surgical History      I have reviewed this  patient's surgical history and updated it with pertinent information if needed.  No past surgical history on file.          Prior to Admission Medications      Cannot display prior to admission medications because the patient has not been admitted in this contact.           Allergies      No Known Allergies          Social History      I have reviewed this patient's social history and updated it with pertinent information if needed. Ant Islasjoan            Family History      I have reviewed this patient's family history and updated it with pertinent information if needed.   No family history on file.          Review of Systems      The 10 point Review of Systems is negative other than noted in the HPI.           Physical Exam                        Vital Signs with Ranges  BP: ()/()   Arterial Line BP: ()/()   0 lbs 0 oz  There is no height or weight on file to calculate BMI.     Constitutional: awake, alert, cooperative, no apparent distress, and appears stated age  Eyes: Lids and lashes normal, pupils equal, round and reactive to light, extra ocular muscles intact, sclera clear, conjunctiva normal  ENT: Normocephalic, without obvious abnormality, atraumatic, sinuses nontender on palpation, reased work of breathing, good air exchange,   Cardiovascular: Normal apical impulse, regular rate and rhythm,   GI: No scars, normal bowel sounds, soft, non-distended, non-tender, no masses palpated, no hepatosplenomegally  Genitounirinary: deferred  Skin: no bruising or bleeding, normal skin color, texture, turgor, no redness, warmth, or swelling, no rashes and no lesions  Musculoskeletal: There is no redness, warmth, or swelling of the joints.  Full range of motion noted.  Motor strength is 5 out of 5 all extremities bilaterally.  Tone is normal.  Neurologic: Awake, alert, oriented to name, place and time.  Cranial nerves II-XII are grossly intact.  Motor is 5 out of 5 bilaterally.  .  Neuropsychiatric: General: normal,  calm and normal eye contact          Data      No results found for this or any previous visit (from the past 24 hour(s)).

## 2019-05-08 LAB — COPATH REPORT: NORMAL

## 2020-02-10 ENCOUNTER — HEALTH MAINTENANCE LETTER (OUTPATIENT)
Age: 35
End: 2020-02-10

## 2020-11-16 ENCOUNTER — HEALTH MAINTENANCE LETTER (OUTPATIENT)
Age: 35
End: 2020-11-16

## 2021-05-26 ENCOUNTER — RECORDS - HEALTHEAST (OUTPATIENT)
Dept: ADMINISTRATIVE | Facility: CLINIC | Age: 36
End: 2021-05-26

## 2022-08-04 ENCOUNTER — OFFICE VISIT (OUTPATIENT)
Dept: OTOLARYNGOLOGY | Facility: CLINIC | Age: 37
End: 2022-08-04
Payer: COMMERCIAL

## 2022-08-04 DIAGNOSIS — R13.10 DYSPHAGIA, UNSPECIFIED TYPE: Primary | ICD-10-CM

## 2022-08-04 DIAGNOSIS — J34.2 DEVIATED NASAL SEPTUM: ICD-10-CM

## 2022-08-04 PROCEDURE — 31575 DIAGNOSTIC LARYNGOSCOPY: CPT | Performed by: OTOLARYNGOLOGY

## 2022-08-04 PROCEDURE — 99203 OFFICE O/P NEW LOW 30 MIN: CPT | Mod: 25 | Performed by: OTOLARYNGOLOGY

## 2022-08-04 NOTE — PATIENT INSTRUCTIONS
Acid Suppression Treatment    Start omeprazole 20mg tab, take 2 tabs 30 minutes before breakfast until you symptoms resolve OR you have taken the medication for 2 months  Then decrease to 1 tab in AM for 1 week, then 1 tablet every other day for another week  If you wish, you can also add pepid at bedtime if you have having symptoms at night  You are being referred to Dr. Umair Riley, a reflux specialist.       Lifestyle changes:    Avoid eating 2-3 hours before bedtime.   You may find it helpful to elevate the head of your bed.     Avoid following foods that are likely to trigger acid reflux:    Coffee or tea (try LOW ACID coffee or herbal tea)  Anything that s fizzy or has caffeine in it  Alcohol   Citrus fruits, such as oranges and baudilio  Tomato based foods (salsa, pizza, lasanga)  Chocolate   Mint or peppermint  Fatty foods (ice cream)  Spicy foods  Onions and garlic

## 2022-08-04 NOTE — PROGRESS NOTES
CHIEF COMPLAINT: Patient presents with:  Ent Problem: Swallowing problems that can lead to choking for 3 years          HISTORY OF PRESENT ILLNESS    Ant was seen at the behest of No referral listed for problems with swallowing.  An episode of food impaction several years ago (pushed through by GI ).  EDG at the time possibly showed monisha e inflammation, but he is unsure.  This was done in [place].  He continues to have dysphgia with solids foods.  Drinks 2 cans of Mountain Dew daily.  Occasional heartburn.  Mostly prepares meals at home.       RSI = 14       REVIEW OF SYSTEMS    Review of Systems as per HPI and PMHx, otherwise 10 system review system are negative.     Patient has no known allergies.     There were no vitals taken for this visit.    HEAD: Normal appearance and symmetry:  No cutaneous lesions.      NECK:  supple     EARS: normal TM, EACs    EYES:  EOMI    CN VII/XII:  intact     NOSE:     Dorsum:   straight  Septum:  deviated left  Mucosa:  moist        ORAL CAVITY/OROPHARYNX:     Lips:  Normal.  Tongue: normal, midline  Mucosa:   no lesions     NECK:  Trachea:  midline.              Thyroid:  normal              Adenopathy:  none        NEURO:   Alert and Oriented     GAIT AND STATION:  normal     RESPIRATORY:   Symmetry and Respiratory effort     PSYCH:  Normal mood and affect     SKIN:   warm and dry         FLEX LARYNGOSCOPY:    After obtaining consent, a flexible laryngoscope is used to examine both nasal cavities, the nasopharynx, pharnx, and larynx.      Nasal cavity: wnl  NP: wnl  Pharnx: wnl  Larynx: thickening of PC        IMPRESSION:    Encounter Diagnoses   Name Primary?     Dysphagia, unspecified type Yes     Deviated nasal septum           RECOMMENDATIONS:      Orders Placed This Encounter   Procedures     Laryngoscopy, Fiber     Adult General Surg Referral      Wean off Mt Dew  Reflux precautions

## 2022-08-04 NOTE — LETTER
8/4/2022         RE: Thomas J Degonda Jr.  [address] [place] [address]        Dear Colleague,    Thank you for referring your patient, Thomas J Degonda Jr., to the [facility]. Please see a copy of my visit note below.    CHIEF COMPLAINT: Patient presents with:  Ent Problem: Swallowing problems that can lead to choking for 3 years          HISTORY OF PRESENT ILLNESS    Ant was seen at the behest of No referral listed for problems with swallowing.  An episode of food impaction several years ago (pushed through by GI ).  EDG at the time possibly showed monisha e inflammation, but he is unsure.  This was done in [place].  He continues to have dysphgia with solids foods.  Drinks 2 cans of Mountain Dew daily.  Occasional heartburn.  Mostly prepares meals at home.              REVIEW OF SYSTEMS    Review of Systems as per HPI and PMHx, otherwise 10 system review system are negative.     Patient has no known allergies.     There were no vitals taken for this visit.    HEAD: Normal appearance and symmetry:  No cutaneous lesions.      NECK:  supple     EARS: normal TM, EACs    EYES:  EOMI    CN VII/XII:  intact     NOSE:     Dorsum:   straight  Septum:  deviated left  Mucosa:  moist        ORAL CAVITY/OROPHARYNX:     Lips:  Normal.  Tongue: normal, midline  Mucosa:   no lesions     NECK:  Trachea:  midline.              Thyroid:  normal              Adenopathy:  none        NEURO:   Alert and Oriented     GAIT AND STATION:  normal     RESPIRATORY:   Symmetry and Respiratory effort     PSYCH:  Normal mood and affect     SKIN:   warm and dry         FLEX LARYNGOSCOPY:    After obtaining consent, a flexible laryngoscope is used to examine both nasal cavities, the nasopharynx, pharnx, and larynx.      Nasal cavity: wnl  NP: wnl  Pharnx: wnl  Larynx: thickening of PC        IMPRESSION:    Encounter Diagnoses   Name Primary?     Dysphagia, unspecified type Yes     Deviated nasal septum            RECOMMENDATIONS:      Orders Placed This Encounter   Procedures     Laryngoscopy, Fiber     Adult General Surg Referral      Wean off Mt Dew  Reflux precautions        Again, thank you for allowing me to participate in the care of your patient.        Sincerely,        Kamran Snowden MD

## 2022-08-05 DIAGNOSIS — R13.10 DYSPHAGIA, UNSPECIFIED TYPE: Primary | ICD-10-CM

## 2022-08-05 NOTE — PROGRESS NOTES
Placed referral for XR esophagram per Dr. Sp SUTTON [facility]      Giulia Borden RN  [facility]  ENT  [address] [address]  [email or web address]   Office:[phone]  Employed by [employer]

## 2022-08-08 NOTE — PATIENT INSTRUCTIONS
GERD education & surgery packet provided to pt.      Josiane SUTTON [facility]  Surgery Clinic [facility]  [facility] - [address] [address]  Office: [phone]  Fax: [phone]

## 2022-08-12 ENCOUNTER — HOSPITAL ENCOUNTER (OUTPATIENT)
Dept: GENERAL RADIOLOGY | Facility: CLINIC | Age: 37
Discharge: HOME | End: 2022-08-12
Attending: OTOLARYNGOLOGY | Admitting: OTOLARYNGOLOGY
Payer: COMMERCIAL

## 2022-08-12 DIAGNOSIS — R13.10 DYSPHAGIA, UNSPECIFIED TYPE: ICD-10-CM

## 2022-08-12 PROCEDURE — 74220 X-RAY XM ESOPHAGUS 1CNTRST: CPT

## 2022-08-12 PROCEDURE — 255N000001 HC RX 255: Performed by: OTOLARYNGOLOGY

## 2022-08-12 RX ADMIN — ANTACID/ANTIFLATULENT 4 G: 380; 550; 10; 10 GRANULE, EFFERVESCENT ORAL at 13:25

## 2022-08-22 ENCOUNTER — OFFICE VISIT (OUTPATIENT)
Dept: SURGERY | Facility: CLINIC | Age: 37
End: 2022-08-22
Attending: OTOLARYNGOLOGY
Payer: COMMERCIAL

## 2022-08-22 DIAGNOSIS — R13.10 DYSPHAGIA, UNSPECIFIED TYPE: ICD-10-CM

## 2022-08-22 PROCEDURE — 99203 OFFICE O/P NEW LOW 30 MIN: CPT | Performed by: SURGERY

## 2022-08-22 NOTE — PROGRESS NOTES
e versus something else plan for endoscopy score is 15    HPI: Thomas J Degonda Jr. is a 37 year old male referred to see me by No Ref-Primary, Physician for evaluation of gastroesophageal reflux disease.  He notes  a several year history of dysphagia with food getting stuck in his distal esophagus.  His most recent event prompted him to undergo endoscopy to push the food through.  He does complain of intermittent esophageal discomfort as well as burning but denies any nighttime cough.  He is able to lay flat at night and denies any water brash.  GERD HR Q OL is 15.    Allergies:Patient has no known allergies.    Past Medical History:   Diagnosis Date     CARDIOVASCULAR SCREENING; LDL GOAL LESS THAN 160 3/7/2012     Obesity        Past Surgical History:   Procedure Laterality Date     ESOPHAGOSCOPY, GASTROSCOPY, DUODENOSCOPY (EGD), COMBINED N/A 5/4/2019    Procedure: ESOPHAGOGASTRODUODENOSCOPY (EGD);  Surgeon: Gee Garay DO;  Location: [facility] OR     No prior surgery         CURRENT MEDS:    Current Outpatient Medications:      ORDER FOR DME, Equipment being ordered: tennis elbow brace, Disp: 1 Device, Rfl: 0    Family History   Problem Relation Age of Onset     Family History Negative Mother      Hyperlipidemia Mother      Family History Negative Father      Family History Negative Brother         reports that he has never smoked. He has never used smokeless tobacco. He reports current alcohol use. He reports that he does not use drugs.    Review of Systems:  The 12 system review is within normal limits except for as mentioned above.  General ROS: No complaints or constitutional symptoms  Ophthalmic ROS: No complaints of visual changes  Skin: No complaints or symptoms   Endocrine: No complaints or symptoms  Hematologic/Lymphatic: No symptoms or complaints  Psychiatric: No symptoms or complaints  Respiratory ROS: no cough, shortness of breath, or wheezing  Cardiovascular ROS: no chest pain or dyspnea  on exertion  Gastrointestinal ROS: As per HPI  Genito-Urinary ROS: no dysuria, trouble voiding, or hematuria  Musculoskeletal ROS: no joint or muscle pain  Neurological ROS: no TIA or stroke symptoms      EXAM:  There were no vitals taken for this visit.  GENERAL: Well developed male, No acute distress, pleasant and conversant   EYES: Pupils equal, round and reactive, no scleral icterus  ABDOMEN: Soft, non-tender, no masses, non-distended  SKIN: Pink, warm and dry, no obvious rashes or lesions   NEURO:No focal deficits, ambulatory  MUSCULOSKELETAL:No obvious deformities, no swelling, normal appearing      LABS:  Lab Results   Component Value Date    WBC 6.8 03/07/2012    HGB 15.0 03/07/2012    HCT 43.8 03/07/2012    MCV 88 03/07/2012     03/07/2012     INR/Prothrombin Time  @LABRCNTIP(NA,K,CL,co2,bun,creatinine,labglom,glucose,calcium)@  No results found for: ALT, AST, GGT, ALKPHOS, BILITOT    IMAGES:   Relevant images were reviewed and discussed with the patient.  Notable findings were: Esophagram images reviewed and demonstrate distal esophageal irregularities consistent with either ulceration versus stricture versus diverticulum    Assessment/Plan:   Thomas J Degonda Jr. is a 37 year old male with signs and symptoms consistent with dysphagia likely secondary to the findings noted on the esophagram.  I have explained the pathophysiology of dysphagia as well as the etiology in detail as well as the surgical versus non-operative management strategies.      At this point we will proceed with continued initial work-up.  This will include a repeat endoscopy to see if we can discern if there are any lesions distally or something that may warrant dilation.    Umair Riley,  [facility]  [phone]  [facility] Department of Surgery

## 2022-08-22 NOTE — LETTER
8/22/2022         RE: Thomas J Degonda Jr.  [address] [place] [address]        Dear Colleague,    Thank you for referring your patient, Thomas J Degonda Jr., to the [facility] [place]. Please see a copy of my visit note below.    e versus something else plan for endoscopy score is 15    HPI: Thomas J Degonda Jr. is a 37 year old male referred to see me by No Ref-Primary, Physician for evaluation of gastroesophageal reflux disease.  He notes  a several year history of dysphagia with food getting stuck in his distal esophagus.  His most recent event prompted him to undergo endoscopy to push the food through.  He does complain of intermittent esophageal discomfort as well as burning but denies any nighttime cough.  He is able to lay flat at night and denies any water brash.  GERD HR Q OL is 15.    Allergies:Patient has no known allergies.    Past Medical History:   Diagnosis Date     CARDIOVASCULAR SCREENING; LDL GOAL LESS THAN 160 3/7/2012     Obesity        Past Surgical History:   Procedure Laterality Date     ESOPHAGOSCOPY, GASTROSCOPY, DUODENOSCOPY (EGD), COMBINED N/A 5/4/2019    Procedure: ESOPHAGOGASTRODUODENOSCOPY (EGD);  Surgeon: Gee Garay DO;  Location: [facility] OR     No prior surgery         CURRENT MEDS:    Current Outpatient Medications:      ORDER FOR DME, Equipment being ordered: tennis elbow brace, Disp: 1 Device, Rfl: 0    Family History   Problem Relation Age of Onset     Family History Negative Mother      Hyperlipidemia Mother      Family History Negative Father      Family History Negative Brother         reports that he has never smoked. He has never used smokeless tobacco. He reports current alcohol use. He reports that he does not use drugs.    Review of Systems:  The 12 system review is within normal limits except for as mentioned above.  General ROS: No complaints or constitutional symptoms  Ophthalmic ROS: No complaints  of visual changes  Skin: No complaints or symptoms   Endocrine: No complaints or symptoms  Hematologic/Lymphatic: No symptoms or complaints  Psychiatric: No symptoms or complaints  Respiratory ROS: no cough, shortness of breath, or wheezing  Cardiovascular ROS: no chest pain or dyspnea on exertion  Gastrointestinal ROS: As per HPI  Genito-Urinary ROS: no dysuria, trouble voiding, or hematuria  Musculoskeletal ROS: no joint or muscle pain  Neurological ROS: no TIA or stroke symptoms      EXAM:  There were no vitals taken for this visit.  GENERAL: Well developed male, No acute distress, pleasant and conversant   EYES: Pupils equal, round and reactive, no scleral icterus  ABDOMEN: Soft, non-tender, no masses, non-distended  SKIN: Pink, warm and dry, no obvious rashes or lesions   NEURO:No focal deficits, ambulatory  MUSCULOSKELETAL:No obvious deformities, no swelling, normal appearing      LABS:  Lab Results   Component Value Date    WBC 6.8 03/07/2012    HGB 15.0 03/07/2012    HCT 43.8 03/07/2012    MCV 88 03/07/2012     03/07/2012     INR/Prothrombin Time  @LABRCNTIP(NA,K,CL,co2,bun,creatinine,labglom,glucose,calcium)@  No results found for: ALT, AST, GGT, ALKPHOS, BILITOT    IMAGES:   Relevant images were reviewed and discussed with the patient.  Notable findings were: Esophagram images reviewed and demonstrate distal esophageal irregularities consistent with either ulceration versus stricture versus diverticulum    Assessment/Plan:   Thomas J Degonda Jr. is a 37 year old male with signs and symptoms consistent with dysphagia likely secondary to the findings noted on the esophagram.  I have explained the pathophysiology of dysphagia as well as the etiology in detail as well as the surgical versus non-operative management strategies.      At this point we will proceed with continued initial work-up.  This will include a repeat endoscopy to see if we can discern if there are any lesions distally or something  that may warrant dilation.    Umair Riley DO FACS  [phone]  [facility] Department of Surgery      Again, thank you for allowing me to participate in the care of your patient.        Sincerely,        Umair Riley DO

## 2022-08-31 PROBLEM — R13.10 DYSPHAGIA, UNSPECIFIED TYPE: Status: ACTIVE | Noted: 2022-08-31

## 2022-10-17 ENCOUNTER — ANESTHESIA EVENT (OUTPATIENT)
Dept: SURGERY | Facility: AMBULATORY SURGERY CENTER | Age: 37
End: 2022-10-17
Payer: COMMERCIAL

## 2022-10-18 ENCOUNTER — ANESTHESIA (OUTPATIENT)
Dept: SURGERY | Facility: AMBULATORY SURGERY CENTER | Age: 37
End: 2022-10-18
Payer: COMMERCIAL

## 2022-10-18 ENCOUNTER — HOSPITAL ENCOUNTER (OUTPATIENT)
Facility: AMBULATORY SURGERY CENTER | Age: 37
Discharge: HOME | End: 2022-10-18
Attending: SURGERY
Payer: COMMERCIAL

## 2022-10-18 VITALS
DIASTOLIC BLOOD PRESSURE: 56 MMHG | HEART RATE: 86 BPM | HEIGHT: 71 IN | SYSTOLIC BLOOD PRESSURE: 112 MMHG | WEIGHT: 295 LBS | BODY MASS INDEX: 41.3 KG/M2 | RESPIRATION RATE: 16 BRPM | TEMPERATURE: 97.1 F | OXYGEN SATURATION: 96 %

## 2022-10-18 DIAGNOSIS — R13.10 DYSPHAGIA, UNSPECIFIED TYPE: ICD-10-CM

## 2022-10-18 PROCEDURE — 43239 EGD BIOPSY SINGLE/MULTIPLE: CPT | Performed by: SURGERY

## 2022-10-18 RX ORDER — ONDANSETRON 2 MG/ML
INJECTION INTRAMUSCULAR; INTRAVENOUS PRN
Status: DISCONTINUED | OUTPATIENT
Start: 2022-10-18 | End: 2022-10-18

## 2022-10-18 RX ORDER — FENTANYL CITRATE 0.05 MG/ML
25 INJECTION, SOLUTION INTRAMUSCULAR; INTRAVENOUS EVERY 5 MIN PRN
Status: DISCONTINUED | OUTPATIENT
Start: 2022-10-18 | End: 2022-10-19 | Stop reason: HOSPADM

## 2022-10-18 RX ORDER — SODIUM CHLORIDE, SODIUM LACTATE, POTASSIUM CHLORIDE, CALCIUM CHLORIDE 600; 310; 30; 20 MG/100ML; MG/100ML; MG/100ML; MG/100ML
INJECTION, SOLUTION INTRAVENOUS CONTINUOUS
Status: DISCONTINUED | OUTPATIENT
Start: 2022-10-18 | End: 2022-10-19 | Stop reason: HOSPADM

## 2022-10-18 RX ORDER — ONDANSETRON 2 MG/ML
4 INJECTION INTRAMUSCULAR; INTRAVENOUS EVERY 30 MIN PRN
Status: DISCONTINUED | OUTPATIENT
Start: 2022-10-18 | End: 2022-10-19 | Stop reason: HOSPADM

## 2022-10-18 RX ORDER — LIDOCAINE 40 MG/G
CREAM TOPICAL
Status: DISCONTINUED | OUTPATIENT
Start: 2022-10-18 | End: 2022-10-19 | Stop reason: HOSPADM

## 2022-10-18 RX ORDER — PROPOFOL 10 MG/ML
INJECTION, EMULSION INTRAVENOUS PRN
Status: DISCONTINUED | OUTPATIENT
Start: 2022-10-18 | End: 2022-10-18

## 2022-10-18 RX ORDER — PROPOFOL 10 MG/ML
INJECTION, EMULSION INTRAVENOUS CONTINUOUS PRN
Status: DISCONTINUED | OUTPATIENT
Start: 2022-10-18 | End: 2022-10-18

## 2022-10-18 RX ORDER — FENTANYL CITRATE 0.05 MG/ML
25 INJECTION, SOLUTION INTRAMUSCULAR; INTRAVENOUS
Status: DISCONTINUED | OUTPATIENT
Start: 2022-10-18 | End: 2022-10-19 | Stop reason: HOSPADM

## 2022-10-18 RX ORDER — LIDOCAINE HYDROCHLORIDE 20 MG/ML
INJECTION, SOLUTION INFILTRATION; PERINEURAL PRN
Status: DISCONTINUED | OUTPATIENT
Start: 2022-10-18 | End: 2022-10-18

## 2022-10-18 RX ORDER — ONDANSETRON 4 MG/1
4 TABLET, ORALLY DISINTEGRATING ORAL EVERY 30 MIN PRN
Status: DISCONTINUED | OUTPATIENT
Start: 2022-10-18 | End: 2022-10-19 | Stop reason: HOSPADM

## 2022-10-18 RX ADMIN — PROPOFOL 200 MCG/KG/MIN: 10 INJECTION, EMULSION INTRAVENOUS at 10:02

## 2022-10-18 RX ADMIN — SODIUM CHLORIDE, SODIUM LACTATE, POTASSIUM CHLORIDE, CALCIUM CHLORIDE: 600; 310; 30; 20 INJECTION, SOLUTION INTRAVENOUS at 09:43

## 2022-10-18 RX ADMIN — PROPOFOL 50 MG: 10 INJECTION, EMULSION INTRAVENOUS at 10:02

## 2022-10-18 RX ADMIN — LIDOCAINE HYDROCHLORIDE 40 MG: 20 INJECTION, SOLUTION INFILTRATION; PERINEURAL at 10:02

## 2022-10-18 RX ADMIN — PROPOFOL 50 MG: 10 INJECTION, EMULSION INTRAVENOUS at 10:07

## 2022-10-18 RX ADMIN — ONDANSETRON 4 MG: 2 INJECTION INTRAMUSCULAR; INTRAVENOUS at 10:02

## 2022-10-18 RX ADMIN — PROPOFOL 50 MG: 10 INJECTION, EMULSION INTRAVENOUS at 10:04

## 2022-10-18 NOTE — H&P
HPI  37 year old year old male who presents for EGD      Allergies:Patient has no known allergies.    Past Medical History:   Diagnosis Date     CARDIOVASCULAR SCREENING; LDL GOAL LESS THAN 160 03/07/2012     Gastroesophageal reflux disease      Motion sickness      Obesity      Sleep apnea     Does not use a CPAP       Past Surgical History:   Procedure Laterality Date     ESOPHAGOSCOPY, GASTROSCOPY, DUODENOSCOPY (EGD), COMBINED N/A 5/4/2019    Procedure: ESOPHAGOGASTRODUODENOSCOPY (EGD);  Surgeon: Gee Garay DO;  Location: [facility]     No prior surgery           CURRENT MEDS:    Current Outpatient Medications:      ORDER FOR DME, Equipment being ordered: tennis elbow brace, Disp: 1 Device, Rfl: 0    Current Facility-Administered Medications:      lactated ringers infusion, , Intravenous, Continuous, Gaurav Ward MD, Last Rate: 100 mL/hr at 10/18/22 0943, New Bag at 10/18/22 0943     lidocaine (LMX4) kit, , Topical, Q1H PRN, Gaurav Ward MD     lidocaine 1 % 0.1-1 mL, 0.1-1 mL, Other, Q1H PRN, Gaurav Ward MD     PRE OP antibiotics NOT needed for this surgical procedure., 1 each, As instructed, Continuous, Umair Riley,      sodium chloride (PF) 0.9% PF flush 3 mL, 3 mL, Intracatheter, Q8H, Gaurav Ward MD     sodium chloride (PF) 0.9% PF flush 3 mL, 3 mL, Intracatheter, q1 min prn, Gaurav Ward MD    FAMHX-reviewed     reports that he has never smoked. He has never used smokeless tobacco. He reports current alcohol use. He reports that he does not use drugs.    Review of Systems:  The 12 point review of systems  is within normal limits except for as mentioned above in the HPI.  General ROS: No complaints or constitutional symptoms  Ophthalmic ROS: No complaints of visual changes  Skin: No complaints or symptoms   Endocrine: No complaints or symptoms  Hematologic/Lymphatic: No symptoms or complaints  Psychiatric: No symptoms or complaints  Respiratory ROS: no cough,  "shortness of breath, or wheezing  Cardiovascular ROS: no chest pain or dyspnea on exertion  Gastrointestinal ROS: As per HPI  Genito-Urinary ROS: no dysuria, trouble voiding, or hematuria  Musculoskeletal ROS: no joint or muscle pain  Neurological ROS: no TIA or stroke symptoms      EXAM:  /79   Temp 97.4  F (36.3  C) (Temporal)   Resp 16   Ht 1.803 m (5' 11\")   Wt 133.8 kg (295 lb)   SpO2 98%   BMI 41.14 kg/m    GENERAL: Well developed male, No acute distress, pleasant and conversant   EYES: Pupils equal, round and reactive, no scleral icterus  ABDOMEN: soft  Lungs- clear to auscultation bilaterally  CV- RRR  SKIN: Pink, warm and dry, no obvious rashes or lesions   NEURO:No focal deficits, ambulatory  MUSCULOSKELETAL:No obvious deformities, no swelling, normal appearing      LABS:  Lab Results   Component Value Date    WBC 6.8 03/07/2012    HGB 15.0 03/07/2012    HCT 43.8 03/07/2012    MCV 88 03/07/2012     03/07/2012     INR/Prothrombin Time  No results for input(s): NA, CO2, BUN, CREATININE, GLUCOSE in the last 168 hours.    Invalid input(s): K, CL, LABGLOM, CALCIUM  No results found for: ALT, AST, GGT, ALKPHOS, BILITOT        Assessment/Plan:   Ant J Degonda Jr. is a 37 year old male with GERD  Plan for EGD      Edd Riley D.O., FACS  [phone]  "

## 2022-10-18 NOTE — DISCHARGE INSTRUCTIONS
If you have any questions or concerns regarding your procedure, please contact Dr. Riley, his office number is [phone].

## 2022-10-18 NOTE — OP NOTE
Name:  Thomas J Degonda Jr.  PCP:  No Ref-Primary, Physician  Procedure Date:  10/18/2022      Procedure(s):  ESOPHAGOGASTRODUODENOSCOPY, WITH BIOPSY    Pre-Procedure Diagnosis:  Dysphagia, unspecified type [R13.10]     Post-Procedure Diagnosis:    Dysphagia    Surgeon(s):  Umair Riley DO    Circulator: Mckenna Centeno RN  Scrub Person: MYNOR FISHER    Anesthesia Type: MAC      Findings:  GE junction and distal esophageal mucosal irregularities  Esophageal concentric rings likely consistent with EOE  Hill grade 2 gastroesophageal flap valve with sliding hiatal hernia  Antral gastritis  Benign-appearing fundal polyps                        Operative Report:    The patient was taken the operating room placed in a left lateral decubitus position.  A bite block was placed and the endoscope was advanced in the oropharynx.  The scope was advanced into the second portion of duodenum without difficulty.  The duodenum appeared grossly normal.  The scope was slowly withdrawn and the antrum appeared to demonstrate antral gastritis.  This was biopsied with cold forcep biopsies..  I then retroflexed the endoscope and noted a Hill grade 2 gastroesophageal flap valve.  I then withdrew the scope to the GE junction.  The Z-line measured approximately at 37 cm.  The GE junction and distal esophagus demonstrated some mucosal irregularities which were biopsied.  Multiple biopsies were taken of the GE junction.  Additional multiple biopsies were taken of the distal esophagus.  There is evidence of [place] grade B esophagitis.  Once the evaluation was complete I then withdrew the scope slowly.  The esophagus demonstrated concentric rings indicative of eosinophilic esophagitis.  Scope was eventually withdrawn, the bite block was removed and the patient was brought sedation and sent to the PACU to undergo recovery.    Estimated Blood Loss:   0    Specimens:    ID Type Source Tests Collected by Time Destination   1 :  Biopsy  Stomach, Antrum SURGICAL PATHOLOGY EXAM Umair Riley,  10/18/2022 10:08 AM    2 : EG junction Biopsy Stomach SURGICAL PATHOLOGY EXAM Umair Riley,  10/18/2022 10:10 AM    4 :  Biopsy Esophagus, Distal SURGICAL PATHOLOGY EXAM Umair Riley,  10/18/2022 10:15 AM           Drains:        Complications:    None    Umair Riley DO

## 2022-10-18 NOTE — ANESTHESIA CARE TRANSFER NOTE
Patient: Thomas J Degonda Jr.    Procedure: Procedure(s):  ESOPHAGOGASTRODUODENOSCOPY, WITH BIOPSY       Diagnosis: Dysphagia, unspecified type [R13.10]  Diagnosis Additional Information: No value filed.    Anesthesia Type:   MAC     Note:    Oropharynx: oropharynx clear of all foreign objects and spontaneously breathing  Level of Consciousness: awake and drowsy  Oxygen Supplementation: room air    Independent Airway: airway patency satisfactory and stable  Dentition: dentition unchanged  Vital Signs Stable: post-procedure vital signs reviewed and stable  Report to RN Given: handoff report given  Patient transferred to: Phase II    Handoff Report: Identifed the Patient, Identified the Reponsible Provider, Reviewed the pertinent medical history, Discussed the surgical course, Reviewed Intra-OP anesthesia mangement and issues during anesthesia, Set expectations for post-procedure period and Allowed opportunity for questions and acknowledgement of understanding      Vitals:  Vitals Value Taken Time   /59 10/18/22 1023   Temp 97.1  F (36.2  C) 10/18/22 1023   Pulse 96 10/18/22 1023   Resp 16 10/18/22 1023   SpO2 97 % 10/18/22 1023       Electronically Signed By: STEVE Mcadams CRNA  October 18, 2022  10:25 AM

## 2022-10-18 NOTE — ANESTHESIA POSTPROCEDURE EVALUATION
Patient: Thomas J Degonda Jr.    Procedure: Procedure(s):  ESOPHAGOGASTRODUODENOSCOPY, WITH BIOPSY       Anesthesia Type:  MAC    Note:  Disposition: Outpatient   Postop Pain Control: Uneventful            Sign Out: Well controlled pain   PONV: No   Neuro/Psych: Uneventful            Sign Out: Acceptable/Baseline neuro status   Airway/Respiratory: Uneventful            Sign Out: Acceptable/Baseline resp. status   CV/Hemodynamics: Uneventful            Sign Out: Acceptable CV status; No obvious hypovolemia; No obvious fluid overload   Other NRE: NONE   DID A NON-ROUTINE EVENT OCCUR? No           Last vitals:  Vitals Value Taken Time   /56 10/18/22 1030   Temp 97.1  F (36.2  C) 10/18/22 1023   Pulse 89 10/18/22 1041   Resp 16 10/18/22 1023   SpO2 98 % 10/18/22 1041   Vitals shown include unvalidated device data.    Electronically Signed By: DALLAS ALVAREZ MD  October 18, 2022  10:56 AM

## 2022-10-24 ENCOUNTER — VIRTUAL VISIT (OUTPATIENT)
Dept: SURGERY | Facility: CLINIC | Age: 37
End: 2022-10-24
Payer: COMMERCIAL

## 2022-10-24 DIAGNOSIS — R13.10 DYSPHAGIA, UNSPECIFIED TYPE: Primary | ICD-10-CM

## 2022-10-24 NOTE — PROGRESS NOTES
Telephone visit:    I called and spoke to Ant regarding the biopsy results and the clinical findings on the endoscopy.  Given my evaluation, he likely has eosinophilic esophagitis.  I discussed this with him and have recommended that he follow-up with an esophageal specialist at [facility] for better mapping and evaluation.  We will place the referral and and I will have him follow-up with me once he has seen them.    Edd Riley DO [facility]  [phone]

## 2022-10-24 NOTE — LETTER
10/24/2022         RE: Thomas J Degonda Jr.  [address] [place] [id number]        Dear Colleague,    Thank you for referring your patient, Thomas J Degonda Jr., to the [facility] [place]. Please see a copy of my visit note below.    Telephone visit:    I called and spoke to Ant regarding the biopsy results and the clinical findings on the endoscopy.  Given my evaluation, he likely has eosinophilic esophagitis.  I discussed this with him and have recommended that he follow-up with an esophageal specialist at [facility] for better mapping and evaluation.  We will place the referral and and I will have him follow-up with me once he has seen them.    Edd Riley DO [facility]  [phone]        Again, thank you for allowing me to participate in the care of your patient.        Sincerely,        Umair Riley, DO

## 2022-10-25 ENCOUNTER — TELEPHONE (OUTPATIENT)
Dept: SURGERY | Facility: CLINIC | Age: 37
End: 2022-10-25

## 2022-10-25 NOTE — TELEPHONE ENCOUNTER
----- Message from Umair Riley,  sent at 10/24/2022 11:31 AM CDT -----  Can we send him to see a [facility] esophageal specialist  at [facility] for evaluation of eosinophilic esophagitis and distal esophageal lesions.  He should then follow-up with me once he has met with them.  Thank you.    jared

## 2022-11-15 ENCOUNTER — TRANSFERRED RECORDS (OUTPATIENT)
Dept: HEALTH INFORMATION MANAGEMENT | Facility: CLINIC | Age: 37
End: 2022-11-15

## 2022-11-19 ENCOUNTER — HEALTH MAINTENANCE LETTER (OUTPATIENT)
Age: 37
End: 2022-11-19

## 2023-02-21 ENCOUNTER — APPOINTMENT (OUTPATIENT)
Dept: OCCUPATIONAL MEDICINE | Facility: CLINIC | Age: 38
End: 2023-02-21

## 2023-02-21 PROCEDURE — 82075 ASSAY OF BREATH ETHANOL: CPT | Performed by: NURSE PRACTITIONER

## 2023-02-21 PROCEDURE — 97799 UNLISTED PHYSCL MED/REHAB PX: CPT | Performed by: NURSE PRACTITIONER

## 2023-02-21 PROCEDURE — 99000 SPECIMEN HANDLING OFFICE-LAB: CPT | Performed by: NURSE PRACTITIONER

## 2023-02-21 PROCEDURE — 99499 UNLISTED E&M SERVICE: CPT | Performed by: NURSE PRACTITIONER

## 2023-12-18 NOTE — ANESTHESIA PREPROCEDURE EVALUATION
Anesthesia Pre-Procedure Evaluation    Patient: Thomas J Degonda Jr.   MRN: [id number] : 1985        Procedure : Procedure(s):  ESOPHAGOGASTRODUODENOSCOPY, WITH BIOPSY          Past Medical History:   Diagnosis Date     CARDIOVASCULAR SCREENING; LDL GOAL LESS THAN 160 2012     Gastroesophageal reflux disease      Motion sickness      Obesity      Sleep apnea     Does not use a CPAP      Past Surgical History:   Procedure Laterality Date     ESOPHAGOSCOPY, GASTROSCOPY, DUODENOSCOPY (EGD), COMBINED N/A 2019    Procedure: ESOPHAGOGASTRODUODENOSCOPY (EGD);  Surgeon: Gee Garay DO;  Location: [facility]     No prior surgery        No Known Allergies   Social History     Tobacco Use     Smoking status: Never     Smokeless tobacco: Never   Substance Use Topics     Alcohol use: Yes     Comment: occasional      Wt Readings from Last 1 Encounters:   10/18/22 133.8 kg (295 lb)        Anesthesia Evaluation   Pt has had prior anesthetic.     History of anesthetic complications  - motion sickness.      ROS/MED HX  ENT/Pulmonary:     (+) sleep apnea, doesn't use CPAP,     Neurologic:  - neg neurologic ROS     Cardiovascular:  - neg cardiovascular ROS     METS/Exercise Tolerance:     Hematologic:  - neg hematologic  ROS     Musculoskeletal:  - neg musculoskeletal ROS     GI/Hepatic: Comment: Dysphagia     (+) GERD, Asymptomatic on medication,     Renal/Genitourinary:  - neg Renal ROS     Endo:     (+) Obesity,     Psychiatric/Substance Use:  - neg psychiatric ROS     Infectious Disease:       Malignancy:  - neg malignancy ROS     Other:  - neg other ROS          Physical Exam    Airway  airway exam normal      Mallampati: II   TM distance: > 3 FB   Neck ROM: full   Mouth opening: > 3 cm    Respiratory Devices and Support         Dental  no notable dental history         Cardiovascular   cardiovascular exam normal       Rhythm and rate: regular and normal     Pulmonary   pulmonary exam normal         error   breath sounds clear to auscultation           OUTSIDE LABS:  CBC:   Lab Results   Component Value Date    WBC 6.8 03/07/2012    HGB 15.0 03/07/2012    HCT 43.8 03/07/2012     03/07/2012     BMP:   Lab Results   Component Value Date     03/07/2012    POTASSIUM 4.4 03/07/2012    CHLORIDE 105 03/07/2012    CO2 25 03/07/2012    BUN 18 03/07/2012    CR 0.72 03/07/2012    GLC 88 12/16/2014    GLC 95 03/07/2012     COAGS: No results found for: PTT, INR, FIBR  POC: No results found for: BGM, HCG, HCGS  HEPATIC: No results found for: ALBUMIN, PROTTOTAL, ALT, AST, GGT, ALKPHOS, BILITOTAL, BILIDIRECT, MAINOR  OTHER:   Lab Results   Component Value Date    JESSIAC 9.3 03/07/2012       Anesthesia Plan    ASA Status:  3   NPO Status:  NPO Appropriate    Anesthesia Type: MAC.     - Reason for MAC: straight local not clinically adequate              Consents    Anesthesia Plan(s) and associated risks, benefits, and realistic alternatives discussed. Questions answered and patient/representative(s) expressed understanding.    - Discussed:     - Discussed with:  Patient         Postoperative Care    Pain management: IV analgesics, Oral pain medications, Multi-modal analgesia.   PONV prophylaxis: Ondansetron (or other 5HT-3), Dexamethasone or Solumedrol, Droperidol or Haldol     Comments:                DALLAS ALVAREZ MD

## 2024-01-28 ENCOUNTER — HEALTH MAINTENANCE LETTER (OUTPATIENT)
Age: 39
End: 2024-01-28

## 2024-05-06 NOTE — ANESTHESIA POSTPROCEDURE EVALUATION
Patient: Thomas J Degonda JrTera    Procedure(s):  ESOPHAGOGASTRODUODENOSCOPY (EGD)    Diagnosis:difficulty swallowing  Diagnosis Additional Information: No value filed.    Anesthesia Type:  General, ETT    Note:  Anesthesia Post Evaluation    Patient location during evaluation: Bedside  Patient participation: Able to fully participate in evaluation  Level of consciousness: awake and alert  Pain management: adequate  Airway patency: patent  Cardiovascular status: acceptable  Respiratory status: acceptable  Hydration status: acceptable  PONV: none     Anesthetic complications: None          Last vitals:  There were no vitals filed for this visit.      Electronically Signed By: Wagner Lawson CRNA, APRN CRNA  May 4, 2019  1:42 PM   [Subsequent Evaluation] : a subsequent evaluation for [FreeTextEntry2] : Chronic pharyngitis

## 2025-02-01 ENCOUNTER — HEALTH MAINTENANCE LETTER (OUTPATIENT)
Age: 40
End: 2025-02-01

## (undated) RX ORDER — LIDOCAINE HYDROCHLORIDE 10 MG/ML
INJECTION, SOLUTION EPIDURAL; INFILTRATION; INTRACAUDAL; PERINEURAL
Status: DISPENSED
Start: 2019-05-04

## (undated) RX ORDER — LIDOCAINE HYDROCHLORIDE 20 MG/ML
JELLY TOPICAL
Status: DISPENSED
Start: 2019-05-04

## (undated) RX ORDER — PROPOFOL 10 MG/ML
INJECTION, EMULSION INTRAVENOUS
Status: DISPENSED
Start: 2019-05-04

## (undated) RX ORDER — FENTANYL CITRATE 50 UG/ML
INJECTION, SOLUTION INTRAMUSCULAR; INTRAVENOUS
Status: DISPENSED
Start: 2019-05-04

## (undated) RX ORDER — GLYCOPYRROLATE 0.2 MG/ML
INJECTION, SOLUTION INTRAMUSCULAR; INTRAVENOUS
Status: DISPENSED
Start: 2019-05-04